# Patient Record
Sex: MALE | Race: WHITE | NOT HISPANIC OR LATINO
[De-identification: names, ages, dates, MRNs, and addresses within clinical notes are randomized per-mention and may not be internally consistent; named-entity substitution may affect disease eponyms.]

---

## 2020-09-24 PROBLEM — Z00.00 ENCOUNTER FOR PREVENTIVE HEALTH EXAMINATION: Status: ACTIVE | Noted: 2020-09-24

## 2020-09-25 PROBLEM — L02.214 ABSCESS OF LEFT GROIN: Status: RESOLVED | Noted: 2020-09-25 | Resolved: 2020-09-25

## 2020-09-25 PROBLEM — Z78.9 DENIES ALCOHOL CONSUMPTION: Status: ACTIVE | Noted: 2020-09-25

## 2020-09-25 PROBLEM — Z86.69 HISTORY OF HEARING LOSS: Status: RESOLVED | Noted: 2020-09-25 | Resolved: 2020-09-25

## 2020-09-25 PROBLEM — Z87.898 HISTORY OF MEMORY LOSS: Status: RESOLVED | Noted: 2020-09-25 | Resolved: 2020-09-25

## 2020-09-25 PROBLEM — Z86.69 HISTORY OF SLEEP APNEA: Status: RESOLVED | Noted: 2020-09-25 | Resolved: 2020-09-25

## 2020-09-25 PROBLEM — Z92.21 HISTORY OF CHEMOTHERAPY: Status: RESOLVED | Noted: 2020-09-25 | Resolved: 2020-09-25

## 2020-09-25 PROBLEM — Z85.01 HISTORY OF MALIGNANT NEOPLASM OF ESOPHAGUS: Status: RESOLVED | Noted: 2020-09-25 | Resolved: 2020-09-25

## 2020-09-25 PROBLEM — I70.0 ABDOMINAL AORTIC ATHEROSCLEROSIS: Status: RESOLVED | Noted: 2020-09-25 | Resolved: 2020-09-25

## 2020-09-25 PROBLEM — F10.10 ALCOHOL ABUSE: Status: RESOLVED | Noted: 2020-09-25 | Resolved: 2020-09-25

## 2020-09-25 PROBLEM — Z87.09 HISTORY OF CHRONIC OBSTRUCTIVE LUNG DISEASE: Status: RESOLVED | Noted: 2020-09-25 | Resolved: 2020-09-25

## 2020-09-25 PROBLEM — Z86.59 HISTORY OF ANXIETY: Status: RESOLVED | Noted: 2020-09-25 | Resolved: 2020-09-25

## 2020-09-25 RX ORDER — DEXTROAMPHETAMINE SACCHARATE, AMPHETAMINE ASPARTATE, DEXTROAMPHETAMINE SULFATE, AND AMPHETAMINE SULFATE 3.75; 3.75; 3.75; 3.75 MG/1; MG/1; MG/1; MG/1
TABLET ORAL
Refills: 0 | Status: ACTIVE | COMMUNITY

## 2020-09-25 RX ORDER — ATORVASTATIN CALCIUM 20 MG/1
20 TABLET, FILM COATED ORAL
Refills: 0 | Status: ACTIVE | COMMUNITY

## 2020-09-25 RX ORDER — TRAZODONE HYDROCHLORIDE 300 MG/1
TABLET ORAL
Refills: 0 | Status: ACTIVE | COMMUNITY

## 2020-09-25 RX ORDER — ALLOPURINOL 200 MG/1
TABLET ORAL
Refills: 0 | Status: ACTIVE | COMMUNITY

## 2020-09-25 RX ORDER — UBIDECARENONE/VIT E ACET 100MG-5
CAPSULE ORAL
Refills: 0 | Status: ACTIVE | COMMUNITY

## 2020-09-25 RX ORDER — CYANOCOBALAMIN (VITAMIN B-12) 1000MCG/ML
1000 VIAL (ML) INJECTION
Refills: 0 | Status: ACTIVE | COMMUNITY

## 2020-09-25 RX ORDER — VENLAFAXINE HCL 37.5 MG
37.5 TABLET ORAL
Refills: 0 | Status: ACTIVE | COMMUNITY

## 2020-09-28 ENCOUNTER — APPOINTMENT (OUTPATIENT)
Dept: RADIATION ONCOLOGY | Facility: CLINIC | Age: 65
End: 2020-09-28
Payer: MEDICARE

## 2020-09-28 VITALS
SYSTOLIC BLOOD PRESSURE: 115 MMHG | HEART RATE: 91 BPM | RESPIRATION RATE: 14 BRPM | OXYGEN SATURATION: 97 % | DIASTOLIC BLOOD PRESSURE: 79 MMHG | TEMPERATURE: 98 F

## 2020-09-28 DIAGNOSIS — Z92.21 PERSONAL HISTORY OF ANTINEOPLASTIC CHEMOTHERAPY: ICD-10-CM

## 2020-09-28 DIAGNOSIS — L02.214 CUTANEOUS ABSCESS OF GROIN: ICD-10-CM

## 2020-09-28 DIAGNOSIS — I70.0 ATHEROSCLEROSIS OF AORTA: ICD-10-CM

## 2020-09-28 DIAGNOSIS — Z92.3 PERSONAL HISTORY OF IRRADIATION: ICD-10-CM

## 2020-09-28 DIAGNOSIS — Z87.898 PERSONAL HISTORY OF OTHER SPECIFIED CONDITIONS: ICD-10-CM

## 2020-09-28 DIAGNOSIS — Z85.01 PERSONAL HISTORY OF MALIGNANT NEOPLASM OF ESOPHAGUS: ICD-10-CM

## 2020-09-28 DIAGNOSIS — F10.10 ALCOHOL ABUSE, UNCOMPLICATED: ICD-10-CM

## 2020-09-28 DIAGNOSIS — Z87.09 PERSONAL HISTORY OF OTHER DISEASES OF THE RESPIRATORY SYSTEM: ICD-10-CM

## 2020-09-28 DIAGNOSIS — Z86.69 PERSONAL HISTORY OF OTHER DISEASES OF THE NERVOUS SYSTEM AND SENSE ORGANS: ICD-10-CM

## 2020-09-28 DIAGNOSIS — Z86.59 PERSONAL HISTORY OF OTHER MENTAL AND BEHAVIORAL DISORDERS: ICD-10-CM

## 2020-09-28 DIAGNOSIS — Z78.9 OTHER SPECIFIED HEALTH STATUS: ICD-10-CM

## 2020-09-28 PROCEDURE — 99204 OFFICE O/P NEW MOD 45 MIN: CPT

## 2020-09-28 NOTE — REASON FOR VISIT
[Brain Metastasis] : brain metastasis [Other: ___] : [unfilled] [Consideration for Non-Curative Therapy] : consideration for non-curative therapy for

## 2020-09-28 NOTE — DISEASE MANAGEMENT
[Pathological] : TNM Stage: p [IV] : IV [FreeTextEntry4] : Metastatic Lung Cancer with CNS metastasis [TTNM] : x [NTNM] : x [MTNM] : x

## 2020-09-28 NOTE — VITALS
[70: Cares for self; unalbe to carry on normal activity or do active work.] : 70: Cares for self; unable to carry on normal activity or do active work. [70: Both greater restriction of and less time spent in play activity.] : 70: Both greater restriction of and less time spent in play activity. [ECOG Performance Status: 3 - Capable of only limited self care, confined to bed or chair more than 50% of waking hours] : Performance Status: 3 - Capable of only limited self care, confined to bed or chair more than 50% of waking hours [Maximal Pain Intensity: 0/10] : 0/10 [Least Pain Intensity: 0/10] : 0/10 [NoTreatment Scheduled] : no treatment scheduled

## 2020-09-28 NOTE — REVIEW OF SYSTEMS
[Fatigue: Grade 1 - Fatigue relieved by rest] : Fatigue: Grade 1 - Fatigue relieved by rest [Tinnitus - Grade 0] : Tinnitus - Grade 0 [Blurred Vision: Grade 0] : Blurred Vision: Grade 0 [Mucositis Oral: Grade 0] : Mucositis Oral: Grade 0  [Xerostomia: Grade 0] : Xerostomia: Grade 0 [Oral Pain: Grade 0] : Oral Pain: Grade 0 [Salivary duct inflammation: Grade 0] : Salivary duct inflammation: Grade 0 [Dysgeusia: Grade 0] : Dysgeusia: Grade 0 [Cognitive Disturbance: Grade 1 - Mild cognitive disability; not interfering with work/school/life performance; specialized educational services/devices not indicated] : Cognitive Disturbance: Grade 1 - Mild cognitive disability; not interfering with work/school/life performance; specialized educational services/devices not indicated [Concentration Impairment: Grade 1] : Concentration Impairment: Grade 1 - Mild inattention or decreased level of concentration [Dizziness: Grade 0] : Dizziness: Grade 0  [Facial Muscle Weakness: Grade 0] : Facial Muscle Weakness: Grade 0 [Headache: Grade 0] : Headache: Grade 0 [Lethargy: Grade 0] : Lethargy: Grade 0 [Meningismus: Grade 0] : Meningismus: Grade 0 [Peripheral Motor Neuropathy: Grade 1 - Asymptomatic; clinical or diagnostic observations only; intervention not indicated] : Peripheral Motor Neuropathy: Grade 1 - Asymptomatic; clinical or diagnostic observations only; intervention not indicated [Peripheral Sensory Neuropathy: Grade 0] : Peripheral Sensory Neuropathy: Grade 0 [Somnolence: Grade 0] : Somnolence: Grade 0 [Cough: Grade 2 - Moderate symptoms, medical intervention indicated; limiting instrumental ADL] : Cough: Grade 2 - Moderate symptoms, medical intervention indicated; limiting instrumental ADL [Dyspnea: Grade 3 - Shortness of breath at rest; limiting self care ADL] : Dyspnea: Grade 3 - Shortness of breath at rest; limiting self care ADL [Hiccups: Grade 0] : Hiccups: Grade 0 [Hoarseness: Grade 0] : Hoarseness: Grade 0 [Hypoxia: Grade 2 - Decreased oxygen saturation with exercise (e.g., pulse oximeter <88%); intermittent supplemental oxygen] : Hypoxia: Grade 2 - Decreased oxygen saturation with exercise (e.g., pulse oximeter <88%); intermittent supplemental oxygen [Pharyngeal Mucositis: Grade 0] : Pharyngeal Mucositis: Grade 0 [Pneumonitis: Grade 0] : Pneumonitis: Grade 0 [Voice Alteration: Grade 0] : Voice Alteration: Grade 0 [de-identified] : wears glasses  [FreeTextEntry9] : bilateral hands have constant hand motion [FreeTextEntry1] : NP cough [FreeTextEntry2] : He is only able to walk short distance due to his breathing

## 2020-09-28 NOTE — LETTER CLOSING
[Consult Closing:] : Thank you for allowing me to participate in the care of this patient.  If you have any questions, please do not hesitate to contact me. [Sincerely yours,] : Sincerely yours, [FreeTextEntry3] : France Baker MD\par

## 2020-09-28 NOTE — HISTORY OF PRESENT ILLNESS
[FreeTextEntry1] : Mr. Brand is a 64 year old male referred by Dr. Goldberg, he is know to us and is presenting with metastatic esophageal cancer with CNS metastasis. He was treated for the esophageal cancer in [place], [place] in 2015 and then moved to [place] after completion of concurrent chemoradiation (Taxol/Carboplatin), to live with his Mother in 2016. He has a history of having a benign skull tumor resection, the removal that left a 2.0 inch incision on the right vertex. He was first consulted here in our center on 11/1/16 by Dr. Bell, and at that time it was determined that he would have a gamma knife. On 11/8/2016 Dr. Bell (Radiation Oncologist- [facility]) and Neurosurgeon Dr. Ramses Waters performed treatment planning in order to target four sites of CNS metastases, three in the cerebellum and a single small 2.0 mm focus in the left frontal lobe. All were treated to a peripheral tumor margin dose of 20 Gy.\par \par On 2/6/2019 the patient saw Dr. Adbul, Neurosurgeon, and was found to have a 4mm lesion in the right occipital lobe on the surface with contrast and FLAIR notability. No repeat MRI was done at that time. \par \par On 9/18/2020 he had a brain MRI at [facility] which revealed that the 4mm enhancing lesion noted on the surface of the right occipital lobe on the prior study is no longer evident. There were 2 new small enhancing nodules that became evident in the right cerebellar hemisphere, currently measuring 7mm and 5mm in diameter, respectively (series images 7-8; series 11 images 8-9). Each of these is associated with a modest rim of vasogenic edema on FLAIR images.  An 8mm subcutaneous nodule in the left suboccipital region had increased  (3mm on prior study); this does not exhibit any contrast enhancement and likely a benign sebaceous cyst. \par \par On 9/20/2020 he had a CT of the abdomen, chest, and pelvis at [facility] which revealed a stable chest CT, stable COPD and interstitial lung disease, no evidence of suspicious lung nodule, mass, acute consolidation, thoracic adenopathy or pleural effusion, and a stable 4.2cm thoracic aortic aneurysm. It revealed a stable abdomen and pelvis. \par \par On 9/22/2030 the patient had a follow up with Dr. Goldberg. The patient appeared to have new brain metastasis, consistent with recurrent disease, he did not have any evidence of systemic recurrence. Dr. Goldberg  is referring Mr. Brand for possible gamma knife radiosurgery. \par \par Mr. Brand has an appointment so see Dr. Mueller next week for further assessment. He denies any headaches, seizures, or changes in his vision. He denies any difficulties with ambulation or changes in his upper or lower extremity strength.  He reported to Dr. Goldberg that one time he found himself on the floor, with no recollection of what occurred, no injury was present. He has difficulty concentrating and short term memory loss. His Mother states that his bilateral hands have constant movement in them . He sees Dr. Rand- Rheumatology for generalized weakness and inflammation in his joints. He has depression. He has been seeing Dr. Medley for COPD, he still is smoking about 2 packs a day. He lives with his Mother in [facility]. He is unable to walk long distances due to increased ANTUNEZ. He can walk about 2 car lengths per his Mother and that is about the most he can do without having to stop and catch his breath. He does not use home oxygen at all.

## 2021-11-01 ENCOUNTER — APPOINTMENT (OUTPATIENT)
Dept: NEUROSURGERY | Facility: CLINIC | Age: 66
End: 2021-11-01
Payer: MEDICARE

## 2021-11-01 PROCEDURE — 99205 OFFICE O/P NEW HI 60 MIN: CPT

## 2022-09-20 ENCOUNTER — APPOINTMENT (OUTPATIENT)
Dept: RADIATION ONCOLOGY | Facility: CLINIC | Age: 67
End: 2022-09-20

## 2022-10-05 ENCOUNTER — APPOINTMENT (OUTPATIENT)
Dept: RADIATION ONCOLOGY | Facility: CLINIC | Age: 67
End: 2022-10-05

## 2022-10-05 DIAGNOSIS — M10.9 GOUT, UNSPECIFIED: ICD-10-CM

## 2022-10-05 DIAGNOSIS — E78.5 HYPERLIPIDEMIA, UNSPECIFIED: ICD-10-CM

## 2022-10-05 DIAGNOSIS — I10 ESSENTIAL (PRIMARY) HYPERTENSION: ICD-10-CM

## 2022-10-05 DIAGNOSIS — F32.A DEPRESSION, UNSPECIFIED: ICD-10-CM

## 2022-10-05 PROCEDURE — 99215 OFFICE O/P EST HI 40 MIN: CPT | Mod: 25

## 2022-10-05 RX ORDER — ALBUTEROL SULFATE 90 UG/1
108 (90 BASE) AEROSOL, METERED RESPIRATORY (INHALATION)
Refills: 0 | Status: DISCONTINUED | COMMUNITY
End: 2022-10-05

## 2022-10-10 NOTE — HISTORY OF PRESENT ILLNESS
[FreeTextEntry1] : Mr. Brand is a 64 year old male known to our department for prior treatment to brain metastases from esophageal cancer.\par \par The patient's history dates back to 2015 when he was treated for distal esophageal cancer with definitive chemoradiation in [place], [place] in 2015 and then moved to [place] after completion of concurrent chemoradiation (Taxol/Carboplatin), to live with his Mother in 2016. \par \par Of note, he has a history of right frontal meningioma grade I resected.. \par \par In 11/2016 he was noted to have brain metastases from esophageal cancer and was treated to four sites, three in the cerebellum and a single small 2.0 mm focus in the left frontal lobe, using gamma knife radiosurgery (GKRS). All were treated to a peripheral tumor margin dose of 20 Gy.\par \par MRI brain 09/2020 showed 2 new small right cerebellar metastases and postoperative changes in the right frontal bone. CT C/A/P showed no disease.\par \par The patient underwent a second course of GKRS in 10/2020 to 2 small right cerebellar lesions\par \par CT of chest abdomen and pelvis on 9/6/2022 showed slight increase in the 9mm ill defined left upper lobe nodule previously 5mm (in March 2022)\par \par An MRI of Brain on 9/6/2022 revealed ( [facility]): Peripherally enhancing metastatic lesion s in the right cerebellum not significantly changes from prior exam although there is mild decrease in vasogenic edema. New 4 MM peripherally enhancing metastatic lesion left anterior temporal lobe. \par \par PET/CT: 9mm hypermetabolic left upper lobe nodule suspicious for neoplasm. No other suspicous lesions. 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 He also smoked 2ppd.  He denies radiation therapy aside from the 3 known courses (to the esophagus in 2015 and to the brain in 2016 and 2020).\par \par Today he states he is easily fatigued and very SOB on any exertion.  He continues to smoke 2 packs per day .  O2 sat on room air is 98%.  He is not currently getting any chemotherapy.  He is otherwise eating and drinking fluids without difficulty.

## 2022-10-10 NOTE — REVIEW OF SYSTEMS
[Visual Disturbances] : no visual disturbances [Abdominal Pain] : no abdominal pain [Vomiting] : no vomiting [Constipation] : no constipation [Diarrhea] : no diarrhea [Confused] : no confusion [Dizziness] : no dizziness [Fainting] : no fainting [Difficulty Walking] : no difficulty walking [Depression] : no depression [de-identified] : pt's mother states that his memory is poor; he also has a resting tremor

## 2022-10-10 NOTE — PHYSICAL EXAM
[de-identified] : Overweight man in no acute distress [de-identified] : Resting tremo, otherwise no neurological deficits; strength intact throughout [de-identified] : Patient has poor memory and relies on his mother to tell his history

## 2022-10-25 ENCOUNTER — NON-APPOINTMENT (OUTPATIENT)
Age: 67
End: 2022-10-25

## 2022-10-25 VITALS
DIASTOLIC BLOOD PRESSURE: 73 MMHG | HEART RATE: 94 BPM | TEMPERATURE: 97.8 F | WEIGHT: 192 LBS | HEIGHT: 67 IN | RESPIRATION RATE: 16 BRPM | BODY MASS INDEX: 30.13 KG/M2 | OXYGEN SATURATION: 98 % | SYSTOLIC BLOOD PRESSURE: 127 MMHG

## 2022-10-25 NOTE — DISEASE MANAGEMENT
[Pathological] : TNM Stage: p [N/A] : Currently not applicable [TTNM] : x [NTNM] : x [MTNM] : x [de-identified] : Patient completed SBRT 1/4 FX for a total of 1200 cGy to the left Upper lung

## 2022-10-25 NOTE — REVIEW OF SYSTEMS
[Fatigue] : fatigue [Shortness Of Breath] : shortness of breath [SOB on Exertion] : shortness of breath during exertion [Negative] : Allergic/Immunologic [Visual Disturbances] : no visual disturbances [Abdominal Pain] : no abdominal pain [Vomiting] : no vomiting [Constipation] : no constipation [Diarrhea] : no diarrhea [Confused] : no confusion [Dizziness] : no dizziness [Fainting] : no fainting [Difficulty Walking] : no difficulty walking [Depression] : no depression [de-identified] : pt's mother states that his memory is poor; he also has a resting tremor

## 2022-10-25 NOTE — HISTORY OF PRESENT ILLNESS
[FreeTextEntry1] : Mr. Brand is a 64 year old male known to our department for prior treatment to brain metastases from esophageal cancer.\par \par The patient's history dates back to 2015 when he was treated for distal esophageal cancer with definitive chemoradiation in [place], [place] in 2015 and then moved to [place] after completion of concurrent chemoradiation (Taxol/Carboplatin), to live with his Mother in 2016. \par \par Of note, he has a history of right frontal meningioma grade I resected.. \par \par In 11/2016 he was noted to have brain metastases from esophageal cancer and was treated to four sites, three in the cerebellum and a single small 2.0 mm focus in the left frontal lobe, using gamma knife radiosurgery (GKRS). All were treated to a peripheral tumor margin dose of 20 Gy.\par \par MRI brain 09/2020 showed 2 new small right cerebellar metastases and postoperative changes in the right frontal bone. CT C/A/P showed no disease.\par \par The patient underwent a second course of GKRS in 10/2020 to 2 small right cerebellar lesions\par \par CT of chest abdomen and pelvis on 9/6/2022 showed slight increase in the 9mm ill defined left upper lobe nodule previously 5mm (in March 2022)\par \par An MRI of Brain on 9/6/2022 revealed ( [facility]): Peripherally enhancing metastatic lesion s in the right cerebellum not significantly changes from prior exam although there is mild decrease in vasogenic edema. New 4 MM peripherally enhancing metastatic lesion left anterior temporal lobe. \par \par PET/CT: 9mm hypermetabolic left upper lobe nodule suspicious for neoplasm. No other suspicous lesions. 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 He also smoked 2ppd.  He denies radiation therapy aside from the 3 known courses (to the esophagus in 2015 and to the brain in 2016 and 2020).\par \par Today he states he is easily fatigued and very SOB on any exertion.  He continues to smoke 2 packs per day .  O2 sat on room air is 98%.  He is not currently getting any chemotherapy.  He is otherwise eating and drinking fluids without difficulty.

## 2022-10-31 ENCOUNTER — NON-APPOINTMENT (OUTPATIENT)
Age: 67
End: 2022-10-31

## 2022-10-31 NOTE — REVIEW OF SYSTEMS
[Fatigue: Grade 0] : Fatigue: Grade 0 [Cough: Grade 0] : Cough: Grade 0 [Dyspnea: Grade 0] : Dyspnea: Grade 0 [Visual Disturbances] : no visual disturbances [Abdominal Pain] : no abdominal pain [Vomiting] : no vomiting [Constipation] : no constipation [Diarrhea] : no diarrhea [Confused] : no confusion [Dizziness] : no dizziness [Fainting] : no fainting [Difficulty Walking] : no difficulty walking [Depression] : no depression [de-identified] : pt's mother states that his memory is poor; he also has a resting tremor

## 2022-10-31 NOTE — DISEASE MANAGEMENT
[Pathological] : TNM Stage: p [N/A] : Currently not applicable [TTNM] : x [MTNM] : x [NTNM] : x [de-identified] : Patient completed SBRT 3/4 FX for a total of 3600 cGy to the left Upper lung

## 2022-10-31 NOTE — HISTORY OF PRESENT ILLNESS
[FreeTextEntry1] : Mr. Brand is a 64 year old male known to our department for prior treatment to brain metastases from esophageal cancer.\par \par The patient's history dates back to 2015 when he was treated for distal esophageal cancer with definitive chemoradiation in [place], [place] in 2015 and then moved to [place] after completion of concurrent chemoradiation (Taxol/Carboplatin), to live with his Mother in 2016. \par \par Of note, he has a history of right frontal meningioma grade I resected.. \par \par In 11/2016 he was noted to have brain metastases from esophageal cancer and was treated to four sites, three in the cerebellum and a single small 2.0 mm focus in the left frontal lobe, using gamma knife radiosurgery (GKRS). All were treated to a peripheral tumor margin dose of 20 Gy.\par \par MRI brain 09/2020 showed 2 new small right cerebellar metastases and postoperative changes in the right frontal bone. CT C/A/P showed no disease.\par \par The patient underwent a second course of GKRS in 10/2020 to 2 small right cerebellar lesions\par \par CT of chest abdomen and pelvis on 9/6/2022 showed slight increase in the 9mm ill defined left upper lobe nodule previously 5mm (in March 2022)\par \par An MRI of Brain on 9/6/2022 revealed ( [facility]): Peripherally enhancing metastatic lesion s in the right cerebellum not significantly changes from prior exam although there is mild decrease in vasogenic edema. New 4 MM peripherally enhancing metastatic lesion left anterior temporal lobe. \par \par PET/CT: 9mm hypermetabolic left upper lobe nodule suspicious for neoplasm. No other suspicous lesions. 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 He also smoked 2ppd.  He denies radiation therapy aside from the 3 known courses (to the esophagus in 2015 and to the brain in 2016 and 2020).\par \par Today he states he is easily fatigued and very SOB on any exertion.  He continues to smoke 2 packs per day .  O2 sat on room air is 98%.  He is not currently getting any chemotherapy.  He is otherwise eating and drinking fluids without difficulty.

## 2022-11-30 ENCOUNTER — APPOINTMENT (OUTPATIENT)
Dept: RADIATION ONCOLOGY | Facility: CLINIC | Age: 67
End: 2022-11-30

## 2022-11-30 VITALS
BODY MASS INDEX: 30.07 KG/M2 | RESPIRATION RATE: 20 BRPM | DIASTOLIC BLOOD PRESSURE: 74 MMHG | WEIGHT: 192 LBS | OXYGEN SATURATION: 95 % | HEART RATE: 100 BPM | SYSTOLIC BLOOD PRESSURE: 137 MMHG

## 2022-11-30 PROCEDURE — 99024 POSTOP FOLLOW-UP VISIT: CPT

## 2022-11-30 RX ORDER — INDOMETHACIN 50 MG/1
50 CAPSULE ORAL
Qty: 60 | Refills: 0 | Status: COMPLETED | COMMUNITY
Start: 2022-07-13

## 2022-11-30 RX ORDER — COLCHICINE 0.6 MG/1
0.6 CAPSULE ORAL
Qty: 180 | Refills: 0 | Status: COMPLETED | COMMUNITY
Start: 2022-07-18

## 2022-11-30 RX ORDER — DEXTROAMPHETAMINE SACCHARATE, AMPHETAMINE ASPARTATE, DEXTROAMPHETAMINE SULFATE AND AMPHETAMINE SULFATE 5; 5; 5; 5 MG/1; MG/1; MG/1; MG/1
20 TABLET ORAL
Qty: 90 | Refills: 0 | Status: COMPLETED | COMMUNITY
Start: 2022-11-03

## 2022-11-30 RX ORDER — VENLAFAXINE HYDROCHLORIDE 75 MG/1
75 CAPSULE, EXTENDED RELEASE ORAL
Qty: 90 | Refills: 0 | Status: COMPLETED | COMMUNITY
Start: 2022-04-12

## 2022-11-30 RX ORDER — VENLAFAXINE HYDROCHLORIDE 150 MG/1
150 CAPSULE, EXTENDED RELEASE ORAL
Qty: 180 | Refills: 0 | Status: COMPLETED | COMMUNITY
Start: 2022-05-09

## 2022-11-30 NOTE — REASON FOR VISIT
[Routine On-Treatment] : a routine on-treatment visit for [Post-Treatment Evaluation] : post-treatment evaluation for [Lung Cancer] : lung cancer

## 2022-11-30 NOTE — HISTORY OF PRESENT ILLNESS
[FreeTextEntry1] : 10/25/22\par Patient presents for OTV.  He completed SBRT 1/4 fractions to the left upper lung for a total of 1,200 cGy.  He denies any pain.  Patient continues to smoke and is SOB on exertion.\par \par 10/31/2022\par Today he is doing well with no complaints, \par \par 11/29/2022\par Today he is here for PTE.  He is feeling well.  He reports no Headache dizziness or vision problems.  He has his usual dyspnea and is still smoking about 2 packs of cigarettes per day.  His 02 sat on room air is 95%. No new scans are seen in chart.  He has not had any recent scans or lab work drawn.

## 2022-11-30 NOTE — PHYSICAL EXAM
[Sclera] : the sclera and conjunctiva were normal [Abdomen Soft] : soft [Nondistended] : nondistended [Abdomen Tenderness] : non-tender [Normal] : no focal deficits [Oriented To Time, Place, And Person] : oriented to person, place, and time

## 2022-11-30 NOTE — REVIEW OF SYSTEMS
[Fatigue: Grade 0] : Fatigue: Grade 0 [Cough: Grade 0] : Cough: Grade 0 [Dyspnea: Grade 0] : Dyspnea: Grade 0 [Dermatitis Radiation: Grade 0] : Dermatitis Radiation: Grade 0

## 2022-11-30 NOTE — DISEASE MANAGEMENT
[Pathological] : TNM Stage: p [N/A] : Currently not applicable [TTNM] : x [NTNM] : x [MTNM] : x [de-identified] : Patient completed SBRT 4/4 FX for a total of 4800 cGy to the left Upper lung

## 2022-12-12 ENCOUNTER — RESULT REVIEW (OUTPATIENT)
Age: 67
End: 2022-12-12

## 2022-12-14 ENCOUNTER — RESULT REVIEW (OUTPATIENT)
Age: 67
End: 2022-12-14

## 2023-01-30 ENCOUNTER — APPOINTMENT (OUTPATIENT)
Dept: RADIATION ONCOLOGY | Facility: CLINIC | Age: 68
End: 2023-01-30
Payer: MEDICARE

## 2023-01-31 ENCOUNTER — RESULT REVIEW (OUTPATIENT)
Age: 68
End: 2023-01-31

## 2023-01-31 ENCOUNTER — NON-APPOINTMENT (OUTPATIENT)
Age: 68
End: 2023-01-31

## 2023-02-07 ENCOUNTER — APPOINTMENT (OUTPATIENT)
Dept: RADIATION ONCOLOGY | Facility: CLINIC | Age: 68
End: 2023-02-07
Payer: MEDICARE

## 2023-02-07 VITALS
DIASTOLIC BLOOD PRESSURE: 80 MMHG | BODY MASS INDEX: 30.07 KG/M2 | SYSTOLIC BLOOD PRESSURE: 130 MMHG | TEMPERATURE: 98 F | OXYGEN SATURATION: 96 % | WEIGHT: 192 LBS | HEART RATE: 100 BPM | RESPIRATION RATE: 20 BRPM

## 2023-02-07 PROCEDURE — 99024 POSTOP FOLLOW-UP VISIT: CPT

## 2023-02-08 NOTE — HISTORY OF PRESENT ILLNESS
[FreeTextEntry1] : Mr. Brand is a 64 year old male w/ brain metastases from esophageal cancer and primary lung cancer, s/p SRS to brain and SBRT to lung presenting for f/u.\par \par The patient's history dates back to 2015 when he was treated for distal esophageal cancer with definitive chemoradiation in [place], [place] in 2015 and then moved to [place] after completion of concurrent chemoradiation (Taxol/Carboplatin), to live with his Mother in 2016. 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 All were treated to a peripheral tumor margin dose of 20 Gy.\par \par MRI brain 09/2020 showed 2 new small right cerebellar metastases and postoperative changes in the right frontal bone. CT C/A/P showed no disease.\par \par The patient underwent a second course of GKRS in 10/2020 to 2 small right cerebellar lesions\par \par CT of chest abdomen and pelvis on 9/6/2022 showed slight increase in the 9mm ill defined left upper lobe nodule previously 5mm (in March 2022)\par \par An MRI of Brain on 9/6/2022 revealed ( [facility]): Peripherally enhancing metastatic lesion s in the right cerebellum not significantly changes from prior exam although there is mild decrease in vasogenic edema. New 4 MM peripherally enhancing metastatic lesion left anterior temporal lobe. \par \par PET/CT: 9mm hypermetabolic left upper lobe nodule suspicious for neoplasm. No other suspicious lesions. 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 He also smoked 2ppd.  He denies radiation therapy aside from the 3 known courses (to the esophagus in 2015 and to the brain in 2016 and 2020).\par \par The patient's case was discussed and a lung biopsy was not felt to be safe in this patient with mulitple co-morbidities. Given the clinica picture, the lung lesion was felt to be a new lung primary and not metsatatic esophageal cancer and the patient underwent a course of definitive SBRT to the left lung lesion to a total dose of 48Gy completed 11/2/22.\par \par He then underwent GKRS to the new brain metastases (presumed to be from his esophageal cancer) on 12/15/22.\par \par Today he is feeling. well.  He denies any Headache dizziness or gait issues.  \par \par He had an MRI on @/!23 which showed Impression: Marked diminished size of left temporal lobe enhancing lesion compatible with\par  treated metastasis.  No new intracranial enhancing lesions. Stable right cerebellar enhancing hemorrhagic lesions. No acute infarct, edema or midline shift.\par \par He has no complaints today related to his treatments however he states that his tremor (likely related to chronic alcohol use or one of his other comorbid conditions) has worsened making it difficult for him to play the guitar.\par \par \par

## 2023-02-08 NOTE — PHYSICAL EXAM
[Sclera] : the sclera and conjunctiva were normal [Abdomen Soft] : soft [Nondistended] : nondistended [Abdomen Tenderness] : non-tender [Supraclavicular Lymph Nodes Enlarged Bilaterally] : supraclavicular [Axillary Lymph Nodes Enlarged Bilaterally] : axillary [Musculoskeletal - Swelling] : no joint swelling [Range of Motion to Joints] : range of motion to joints [Normal] : normal skin color and pigmentation and no rash [No Focal Deficits] : no focal deficits [Sensation] : the sensory exam was normal to light touch and pinprick [Motor Exam] : the motor exam was normal [Oriented To Time, Place, And Person] : oriented to person, place, and time [de-identified] : Disheveled appearance but well nourished and in no acute distress [de-identified] : +tremor

## 2023-02-08 NOTE — DISEASE MANAGEMENT
[TTNM] : x [NTNM] : x [MTNM] : x [de-identified] : Patient completed SBRT 4/4 FX for a total of 4800 cGy to the left Upper lung

## 2023-05-07 ENCOUNTER — RESULT REVIEW (OUTPATIENT)
Age: 68
End: 2023-05-07

## 2023-05-15 ENCOUNTER — APPOINTMENT (OUTPATIENT)
Dept: RADIATION ONCOLOGY | Facility: CLINIC | Age: 68
End: 2023-05-15
Payer: MEDICARE

## 2023-05-15 VITALS
TEMPERATURE: 98 F | BODY MASS INDEX: 32.58 KG/M2 | HEART RATE: 83 BPM | OXYGEN SATURATION: 93 % | DIASTOLIC BLOOD PRESSURE: 85 MMHG | RESPIRATION RATE: 18 BRPM | SYSTOLIC BLOOD PRESSURE: 141 MMHG | WEIGHT: 208 LBS

## 2023-05-15 DIAGNOSIS — C34.12 MALIGNANT NEOPLASM OF UPPER LOBE, LEFT BRONCHUS OR LUNG: ICD-10-CM

## 2023-05-15 PROCEDURE — 99214 OFFICE O/P EST MOD 30 MIN: CPT

## 2023-05-15 RX ORDER — DEXTROAMPHETAMINE SACCHARATE, AMPHETAMINE ASPARTATE, DEXTROAMPHETAMINE SULFATE AND AMPHETAMINE SULFATE 7.5; 7.5; 7.5; 7.5 MG/1; MG/1; MG/1; MG/1
30 TABLET ORAL
Qty: 60 | Refills: 0 | Status: ACTIVE | COMMUNITY
Start: 2023-05-03

## 2023-05-15 NOTE — DISEASE MANAGEMENT
[Pathological] : TNM Stage: p [N/A] : Currently not applicable [TTNM] : x [NTNM] : x [MTNM] : x [de-identified] : Patient completed SBRT 4/4 FX for a total of 4800 cGy to the left Upper lung

## 2023-05-15 NOTE — PHYSICAL EXAM
[General Appearance - Well Developed] : well developed [General Appearance - In No Acute Distress] : in no acute distress [Sclera] : the sclera and conjunctiva were normal [Heart Rate And Rhythm] : heart rate and rhythm were normal [Abdomen Soft] : soft [Nondistended] : nondistended [Abdomen Tenderness] : non-tender [Normal] : no joint swelling, no clubbing or cyanosis of the fingernails and muscle strength and tone were normal [No Focal Deficits] : no focal deficits [Oriented To Time, Place, And Person] : oriented to person, place, and time

## 2023-05-15 NOTE — HISTORY OF PRESENT ILLNESS
[FreeTextEntry1] : Mr. 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 All were treated to a peripheral tumor margin dose of 20 Gy.\par \par MRI brain 09/2020 showed 2 new small right cerebellar metastases and postoperative changes in the right frontal bone. CT C/A/P showed no disease.\par \par The patient underwent a second course of GKRS in 10/2020 to 2 small right cerebellar lesions\par \par CT of chest abdomen and pelvis on 9/6/2022 showed slight increase in the 9mm ill defined left upper lobe nodule previously 5mm (in March 2022)\par \par An MRI of Brain on 9/6/2022 revealed ( [facility]): Peripherally enhancing metastatic lesion s in the right cerebellum not significantly changes from prior exam although there is mild decrease in vasogenic edema. New 4 MM peripherally enhancing metastatic lesion left anterior temporal lobe. \par \par PET/CT: 9mm hypermetabolic left upper lobe nodule suspicious for neoplasm. No other suspicious lesions. 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 He also smoked 2ppd.  He denies radiation therapy aside from the 3 known courses (to the esophagus in 2015 and to the brain in 2016 and 2020).\par \par The patient's case was discussed and a lung biopsy was not felt to be safe in this patient with multiple co- morbidities. Given the clinica picture, the lung lesion was felt to be a new lung primary and not metastatic esophageal cancer and the patient underwent a course of definitive SBRT to the left lung lesion to a total dose of 48 Gy completed 11/2/22.\par \par He then underwent GKRS to the new brain metastases (presumed to be from his esophageal cancer) on 12/15/22.\par \par He had an MRI on 2/1/!23 which showed Impression: Marked diminished size of left temporal lobe enhancing lesion compatible with\par  treated metastasis.  No new intracranial enhancing lesions. Stable right cerebellar enhancing hemorrhagic lesions. No acute infarct, edema or midline shift.\par \par 5/8/2023 CT Chest: Chronic interstitial lung disease, slightly increased from the prior studies  in a UIP pattern. Differential diagnosis includes fibrotic NSIP. Recommend  pulmonary consultation. Segmental left upper lobe consolidation with air bronchograms probably chronic  radiation pneumonitis given the patient's history of radiation to that area. However underlying pneumonia cannot be excluded.  Interval development of a couple of bilateral new lung nodules measuring up to 8.5 mm. These nodules are nonspecific, however in a patient with a history of metastatic esophageal and lung cancer, these findings are concerning for  metastatic disease. Consider a PET/CT scan.\par \par 5/8/2023 MRI Brain: No appreciable enhancement identified at the site of the previously identified left temporal lobe metastatic lesion.\par  Stable foci of enhancement and susceptibility within the right cerebellum. No new abnormal intracranial enhancement.\par \par 5/15/23\par Today he is feeling SOB with exertion.  He denies H/A dizziness or gait imbalance.  His appetite is fair to poor .  O2 sat on room air at 93%\par

## 2023-05-15 NOTE — REVIEW OF SYSTEMS
[Fatigue] : fatigue [SOB on Exertion] : shortness of breath during exertion [Cough] : no cough [Dizziness] : no dizziness [Difficulty Walking] : no difficulty walking [Negative] : Allergic/Immunologic

## 2023-08-09 ENCOUNTER — APPOINTMENT (OUTPATIENT)
Dept: RADIATION ONCOLOGY | Facility: CLINIC | Age: 68
End: 2023-08-09

## 2023-10-01 PROBLEM — Z92.3 HISTORY OF RADIATION THERAPY: Status: RESOLVED | Noted: 2020-09-25 | Resolved: 2023-10-01

## 2023-11-01 ENCOUNTER — NON-APPOINTMENT (OUTPATIENT)
Age: 68
End: 2023-11-01

## 2023-11-03 ENCOUNTER — APPOINTMENT (OUTPATIENT)
Dept: RADIATION ONCOLOGY | Facility: CLINIC | Age: 68
End: 2023-11-03

## 2023-12-15 ENCOUNTER — APPOINTMENT (OUTPATIENT)
Dept: RADIATION ONCOLOGY | Facility: CLINIC | Age: 68
End: 2023-12-15
Payer: MEDICARE

## 2023-12-15 ENCOUNTER — TRANSCRIPTION ENCOUNTER (OUTPATIENT)
Age: 68
End: 2023-12-15

## 2023-12-15 DIAGNOSIS — C79.31 SECONDARY MALIGNANT NEOPLASM OF BRAIN: ICD-10-CM

## 2023-12-15 PROCEDURE — 99205 OFFICE O/P NEW HI 60 MIN: CPT | Mod: 95

## 2023-12-15 PROCEDURE — 99215 OFFICE O/P EST HI 40 MIN: CPT | Mod: 95

## 2023-12-15 NOTE — HISTORY OF PRESENT ILLNESS
[Home] : at home, [unfilled] , at the time of the visit. [Medical Office: ([facility])___] : at the medical office located in  [Family Member] : family member [Verbal consent obtained from patient] : the patient, [unfilled] [FreeTextEntry1] : Mr. 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  The patient's history dates back to 2015 when he was treated for distal esophageal cancer with definitive chemoradiation in [place], [place] in 2015 and then moved to [place] after completion of concurrent chemoradiation (Taxol/Carboplatin), to live with his mother in 2016.  Of note, he has a history of right frontal meningioma grade I resected.  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 All were treated to a peripheral tumor margin dose of 20 Gy.  MRI brain 09/2020 showed 2 new small right cerebellar metastases and postoperative changes in the right frontal bone. CT C/A/P showed no disease.  The patient underwent a second course of GKRS in 10/2020 to 2 small right cerebellar lesions.  CT of chest abdomen and pelvis on 9/6/2022 showed slight increase in the 9mm ill-defined left upper lobe nodule previously 5mm (in March 2022)  An MRI of Brain on 9/6/2022 revealed ( [facility]): Peripherally enhancing metastatic lesions in the right cerebellum not significantly changed from prior exam although there is mild decrease in vasogenic edema. New 4 MM peripherally enhancing metastatic lesion left anterior temporal lobe.  PET/CT: 9mm hypermetabolic left upper lobe nodule suspicious for neoplasm. No other suspicious lesions.  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 He also smoked 2ppd. He denies radiation therapy aside from the 3 known courses (to the esophagus in 2015 and to the brain in 2016 and 2020).  The patient's case was discussed, and a lung biopsy was not felt to be safe in this patient with multiple co- morbidities. Given the clinical picture, the lung lesion was felt to be a new lung primary and not metastatic esophageal cancer and the patient underwent a course of definitive SBRT to the left lung lesion to a total dose of 48 Gy completed 11/2/22.  He then underwent GKRS to the new brain metastases (presumed to be from his esophageal cancer) on 12/15/22.  MRI of brain w/wo contrast at [facility] 8/16/23 enhancing lesion in the right cerebellum similar or slightly decreased from prior exam. New 4.5 MM enhancing lesion at the superior aspect left cerebellum. Previous 4mm enhancing lesion in the left temporal lobe is not clearly demonstrated. again.  Ct of CAP 8/16/23 Impression Chest- previously seen 9mm left upper lobe nodule no longer visible. New triangle/wedge shaped left upper lobe airspace opacity with air bronchograms. nonspecific but may represent post radiation changes. 7-8 mm upper lobe nodule increased compared to prior exam previously 4 mm. stable moderate interstitial changes and stable emphysematous changes ABDOMEN/PELVIS no evidence of metastatic disease.  CT C/A/P Nov 2023: slight increase in number/size of lung lesions  MRI nov 2023: 1.6cm enhancing left cerebellar lesion concerning for metastasis  12/15/23 Here today for f/u. He has occasional h/a and also states that when walking he occasionally loses his balance. He denies other symptoms.

## 2023-12-15 NOTE — REVIEW OF SYSTEMS
[Shortness Of Breath] : shortness of breath [Cough] : cough [Difficulty Walking] : difficulty walking [Negative] : Allergic/Immunologic

## 2023-12-15 NOTE — DISEASE MANAGEMENT
[Pathological] : TNM Stage: p [N/A] : Currently not applicable [TTNM] : x [NTNM] : x [MTNM] : x [de-identified] : Patient completed SBRT 4/4 FX for a total of 4800 cGy to the left Upper lung

## 2023-12-15 NOTE — PHYSICAL EXAM
[Normal] : well developed, well nourished, in no acute distress [Sclera] : the sclera and conjunctiva were normal [] : no respiratory distress [Oriented To Time, Place, And Person] : oriented to person, place, and time [de-identified] : Limited exam due to telehelath visit

## 2023-12-18 ENCOUNTER — RESULT REVIEW (OUTPATIENT)
Age: 68
End: 2023-12-18

## 2023-12-19 ENCOUNTER — TRANSCRIPTION ENCOUNTER (OUTPATIENT)
Age: 68
End: 2023-12-19

## 2023-12-19 RX ORDER — DEXAMETHASONE 4 MG/1
4 TABLET ORAL
Qty: 12 | Refills: 0 | Status: ACTIVE | COMMUNITY
Start: 2023-12-19 | End: 1900-01-01

## 2024-01-18 NOTE — DISEASE MANAGEMENT
[Pathological] : TNM Stage: p [TTNM] : x [NTNM] : x [MTNM] : x [N/A] : Currently not applicable [de-identified] : 12/19/23 Left Cerebellum Gamma knife to 2000 cGY

## 2024-01-18 NOTE — HISTORY OF PRESENT ILLNESS
[FreeTextEntry1] : This is a 68M w/ h/o esophageal cancer and stage I lung cancer s/p SBRT to the lung cancer and SRS to prior known brain metastases from the esophageal cancer with a new metastases in the brain.  He is now s/p Gamma knife to the cerebellum on 12/19/23 2000cGY.  Today 2/6/24 he is here for PTE

## 2024-02-06 ENCOUNTER — NON-APPOINTMENT (OUTPATIENT)
Age: 69
End: 2024-02-06

## 2024-02-06 ENCOUNTER — APPOINTMENT (OUTPATIENT)
Dept: RADIATION ONCOLOGY | Facility: CLINIC | Age: 69
End: 2024-02-06